# Patient Record
Sex: FEMALE | Race: WHITE | Employment: UNEMPLOYED | URBAN - METROPOLITAN AREA
[De-identification: names, ages, dates, MRNs, and addresses within clinical notes are randomized per-mention and may not be internally consistent; named-entity substitution may affect disease eponyms.]

---

## 2018-05-22 ENCOUNTER — HOSPITAL ENCOUNTER (OUTPATIENT)
Dept: MAMMOGRAPHY | Age: 53
Discharge: HOME | End: 2018-05-22
Attending: FAMILY MEDICINE
Payer: COMMERCIAL

## 2018-05-22 DIAGNOSIS — Z12.39 ENCOUNTER FOR SCREENING FOR MALIGNANT NEOPLASM OF BREAST: ICD-10-CM

## 2018-05-22 PROCEDURE — 77067 SCR MAMMO BI INCL CAD: CPT | Performed by: FAMILY MEDICINE

## 2018-05-22 PROCEDURE — 77063 BREAST TOMOSYNTHESIS BI: CPT | Performed by: FAMILY MEDICINE

## 2018-06-14 ENCOUNTER — HOSPITAL ENCOUNTER (OUTPATIENT)
Dept: MAMMOGRAPHY | Age: 53
Discharge: HOME | End: 2018-06-14
Attending: FAMILY MEDICINE
Payer: COMMERCIAL

## 2018-06-14 DIAGNOSIS — R92.2 INCONCLUSIVE MAMMOGRAM: ICD-10-CM

## 2018-06-14 PROCEDURE — 77065 DX MAMMO INCL CAD UNI: CPT | Performed by: FAMILY MEDICINE

## 2018-09-26 ENCOUNTER — HOSPITAL ENCOUNTER (OUTPATIENT)
Dept: GENERAL RADIOLOGY | Age: 53
Discharge: HOME | End: 2018-09-26
Attending: INTERNAL MEDICINE
Payer: COMMERCIAL

## 2018-09-26 DIAGNOSIS — M35.3 PMR (POLYMYALGIA RHEUMATICA) (HCC): ICD-10-CM

## 2018-09-26 PROCEDURE — 73562 X-RAY EXAM OF KNEE 3: CPT | Performed by: INTERNAL MEDICINE

## 2018-09-26 PROCEDURE — 72100 X-RAY EXAM L-S SPINE 2/3 VWS: CPT | Performed by: INTERNAL MEDICINE

## 2018-09-26 PROCEDURE — 73130 X-RAY EXAM OF HAND: CPT | Performed by: INTERNAL MEDICINE

## 2018-09-26 PROCEDURE — 73523 X-RAY EXAM HIPS BI 5/> VIEWS: CPT | Performed by: INTERNAL MEDICINE

## 2019-06-17 ENCOUNTER — HOSPITAL ENCOUNTER (OUTPATIENT)
Dept: GENERAL RADIOLOGY | Age: 54
Discharge: HOME | End: 2019-06-17
Attending: INTERNAL MEDICINE
Payer: COMMERCIAL

## 2019-06-17 DIAGNOSIS — M25.579 ANKLE PAIN, UNSPECIFIED CHRONICITY, UNSPECIFIED LATERALITY: ICD-10-CM

## 2019-06-17 DIAGNOSIS — M54.9 BACK PAIN: ICD-10-CM

## 2019-06-17 DIAGNOSIS — M25.579 ANKLE PAIN: ICD-10-CM

## 2019-06-17 DIAGNOSIS — Z79.52 LONG TERM (CURRENT) USE OF SYSTEMIC STEROIDS: ICD-10-CM

## 2019-06-17 PROCEDURE — 72100 X-RAY EXAM L-S SPINE 2/3 VWS: CPT | Performed by: INTERNAL MEDICINE

## 2019-06-17 PROCEDURE — 73630 X-RAY EXAM OF FOOT: CPT | Performed by: INTERNAL MEDICINE

## 2019-06-17 PROCEDURE — 73610 X-RAY EXAM OF ANKLE: CPT | Performed by: INTERNAL MEDICINE

## 2019-06-28 ENCOUNTER — HOSPITAL ENCOUNTER (OUTPATIENT)
Dept: MRI IMAGING | Age: 54
Discharge: HOME | End: 2019-06-28
Attending: INTERNAL MEDICINE
Payer: COMMERCIAL

## 2019-06-28 DIAGNOSIS — M19.90 OSTEOARTHRITIS, UNSPECIFIED OSTEOARTHRITIS TYPE, UNSPECIFIED SITE: ICD-10-CM

## 2019-06-28 DIAGNOSIS — Z79.52 LONG TERM CURRENT USE OF SYSTEMIC STEROIDS: ICD-10-CM

## 2019-06-28 DIAGNOSIS — M35.3 PMR (POLYMYALGIA RHEUMATICA) (HCC): ICD-10-CM

## 2019-06-28 DIAGNOSIS — M79.18 MYOFASCIAL PAIN SYNDROME: ICD-10-CM

## 2019-06-28 PROCEDURE — 73723 MRI JOINT LWR EXTR W/O&W/DYE: CPT | Performed by: INTERNAL MEDICINE

## 2019-06-28 PROCEDURE — A9575 INJ GADOTERATE MEGLUMI 0.1ML: HCPCS | Performed by: RADIOLOGY

## 2019-06-28 PROCEDURE — 73720 MRI LWR EXTREMITY W/O&W/DYE: CPT | Performed by: INTERNAL MEDICINE

## 2020-01-23 ENCOUNTER — HOSPITAL ENCOUNTER (OUTPATIENT)
Dept: CT IMAGING | Age: 55
End: 2020-01-23
Attending: INTERNAL MEDICINE
Payer: COMMERCIAL

## 2020-01-23 ENCOUNTER — HOSPITAL ENCOUNTER (OUTPATIENT)
Dept: MAMMOGRAPHY | Age: 55
Discharge: HOME | End: 2020-01-23
Attending: FAMILY MEDICINE
Payer: COMMERCIAL

## 2020-01-23 DIAGNOSIS — Z12.31 ENCOUNTER FOR SCREENING MAMMOGRAM FOR MALIGNANT NEOPLASM OF BREAST: ICD-10-CM

## 2020-01-23 PROCEDURE — 77063 BREAST TOMOSYNTHESIS BI: CPT | Performed by: FAMILY MEDICINE

## 2020-01-23 PROCEDURE — 77067 SCR MAMMO BI INCL CAD: CPT | Performed by: FAMILY MEDICINE

## 2020-02-10 ENCOUNTER — TELEPHONE (OUTPATIENT)
Dept: SURGERY | Facility: CLINIC | Age: 55
End: 2020-02-10

## 2020-02-12 ENCOUNTER — OFFICE VISIT (OUTPATIENT)
Dept: SURGERY | Facility: CLINIC | Age: 55
End: 2020-02-12
Payer: COMMERCIAL

## 2020-02-12 VITALS
WEIGHT: 293 LBS | HEART RATE: 80 BPM | RESPIRATION RATE: 16 BRPM | SYSTOLIC BLOOD PRESSURE: 123 MMHG | DIASTOLIC BLOOD PRESSURE: 70 MMHG | HEIGHT: 66.5 IN | BODY MASS INDEX: 46.53 KG/M2

## 2020-02-12 DIAGNOSIS — E66.01 MORBID OBESITY DUE TO EXCESS CALORIES (HCC): Primary | ICD-10-CM

## 2020-02-12 PROBLEM — M54.9 BACK PAIN: Status: ACTIVE | Noted: 2020-02-12

## 2020-02-12 PROBLEM — M19.90 OSTEOARTHRITIS: Status: ACTIVE | Noted: 2020-02-12

## 2020-02-12 RX ORDER — LEVOTHYROXINE SODIUM 0.05 MG/1
50 TABLET ORAL DAILY
COMMUNITY
Start: 2018-06-27 | End: 2020-07-30

## 2020-02-12 RX ORDER — HYDROXYCHLOROQUINE SULFATE 200 MG/1
400 TABLET, FILM COATED ORAL DAILY
COMMUNITY
Start: 2020-01-07

## 2020-02-12 RX ORDER — TRAMADOL HYDROCHLORIDE 50 MG/1
50 TABLET ORAL NIGHTLY
Refills: 3 | COMMUNITY
Start: 2019-11-28

## 2020-02-12 RX ORDER — RISEDRONATE SODIUM 150 MG/1
150 TABLET, FILM COATED ORAL
COMMUNITY
Start: 2019-09-12

## 2020-02-12 RX ORDER — CELECOXIB 100 MG/1
100 CAPSULE ORAL 2 TIMES DAILY
COMMUNITY
Start: 2018-08-10

## 2020-02-12 RX ORDER — PREDNISONE 1 MG/1
10 TABLET ORAL DAILY
COMMUNITY
Start: 2019-12-07

## 2020-02-12 RX ORDER — OMEPRAZOLE 20 MG/1
20 CAPSULE, DELAYED RELEASE ORAL DAILY
COMMUNITY

## 2020-02-12 RX ORDER — GUAIFENESIN/EPHEDRINE HCL 200-12.5MG
1 TABLET ORAL DAILY
COMMUNITY

## 2020-02-12 NOTE — H&P
[address]  Dept: [phone]    2/12/2020  Bariatric New Patient Evaluation    Chief Complaint:  morbid obesity     History of Present Illness Take 50 mcg by mouth daily. , Disp: , Rfl:   •  Hydroxychloroquine Sulfate 200 MG Oral Tab, Take 200 mg by mouth daily. , Disp: , Rfl:   •  5-Hydroxytryptophan (5-HTP) 100 MG Oral Cap, Take 1 tablet by mouth daily. , Disp: , Rfl:   •  omeprazole 20 MG Oral Ca cardiac, pulmonary, dietician, and psychology evaluations. Baseline laboratory studies will be checked. This will be done at [address] [place] [address] due to pt's insurance requirements. The patient will be referred to [facility] for medically supervised weight loss.   I

## 2020-02-13 ENCOUNTER — OFFICE VISIT (OUTPATIENT)
Dept: INTERNAL MEDICINE CLINIC | Facility: CLINIC | Age: 55
End: 2020-02-13
Payer: COMMERCIAL

## 2020-02-13 VITALS
HEART RATE: 78 BPM | WEIGHT: 293 LBS | SYSTOLIC BLOOD PRESSURE: 124 MMHG | BODY MASS INDEX: 46.53 KG/M2 | HEIGHT: 66.5 IN | RESPIRATION RATE: 16 BRPM | DIASTOLIC BLOOD PRESSURE: 80 MMHG

## 2020-02-13 DIAGNOSIS — M12.30 PALINDROMIC RHEUMATISM: ICD-10-CM

## 2020-02-13 DIAGNOSIS — E66.01 OBESITY, CLASS III, BMI 40-49.9 (MORBID OBESITY) (HCC): ICD-10-CM

## 2020-02-13 DIAGNOSIS — Z51.81 THERAPEUTIC DRUG MONITORING: Primary | ICD-10-CM

## 2020-02-13 DIAGNOSIS — M19.90 OSTEOARTHRITIS, UNSPECIFIED OSTEOARTHRITIS TYPE, UNSPECIFIED SITE: ICD-10-CM

## 2020-02-13 DIAGNOSIS — G47.00 INSOMNIA, UNSPECIFIED TYPE: ICD-10-CM

## 2020-02-13 DIAGNOSIS — E03.9 HYPOTHYROIDISM, UNSPECIFIED TYPE: ICD-10-CM

## 2020-02-13 PROBLEM — Z79.52 LONG TERM CURRENT USE OF SYSTEMIC STEROIDS: Status: ACTIVE | Noted: 2018-09-17

## 2020-02-13 PROBLEM — M79.7 FIBROMYALGIA: Status: ACTIVE | Noted: 2018-01-11

## 2020-02-13 PROBLEM — M70.62 TROCHANTERIC BURSITIS OF LEFT HIP: Status: ACTIVE | Noted: 2018-08-07

## 2020-02-13 PROBLEM — E66.9 OBESITY: Status: ACTIVE | Noted: 2017-03-31

## 2020-02-13 PROBLEM — K21.9 GASTROESOPHAGEAL REFLUX DISEASE WITHOUT ESOPHAGITIS: Status: ACTIVE | Noted: 2017-03-31

## 2020-02-13 PROBLEM — M25.50 MULTIPLE JOINT PAIN: Status: ACTIVE | Noted: 2018-08-07

## 2020-02-13 PROBLEM — J30.9 ALLERGIC RHINITIS: Status: ACTIVE | Noted: 2017-03-31

## 2020-02-13 PROCEDURE — 99204 OFFICE O/P NEW MOD 45 MIN: CPT | Performed by: INTERNAL MEDICINE

## 2020-02-13 RX ORDER — FLUTICASONE PROPIONATE 50 MCG
SPRAY, SUSPENSION (ML) NASAL DAILY
COMMUNITY

## 2020-02-13 NOTE — PATIENT INSTRUCTIONS
Plan:  Continue with medications:   Go to the lab for blood work   Follow up with me in 1 month  Schedule follow up appointments: Sandoval Lopez (dietitian) or Alfredo Calderon (presurgery dietitian)   Check for insurance coverage for dietitian and labwork pr -cured meats (monitor for sodium issues)   -hummus with vegetables  -bean dip with vegetables    FRUIT  Low carb fruit options   Raspberries: Half a cup (60 grams) contains 3 grams of carbs. Blackberries: Half a cup (70 grams) contains 4 grams of carbs.

## 2020-03-06 ENCOUNTER — TELEPHONE (OUTPATIENT)
Dept: INTERNAL MEDICINE CLINIC | Facility: CLINIC | Age: 55
End: 2020-03-06

## 2020-03-06 DIAGNOSIS — E66.01 OBESITY, CLASS III, BMI 40-49.9 (MORBID OBESITY) (HCC): Primary | ICD-10-CM

## 2020-03-07 NOTE — TELEPHONE ENCOUNTER
Martha Kirkpatrick RN Hi Amy,     Can you please enter a Dietitian order for Dr. Edilberto Yates patient who is seeing Lela Robles for initial appt on Tuesday next week?      I will use E66.01 to verify benefits       Order placed

## 2020-03-09 ENCOUNTER — APPOINTMENT (OUTPATIENT)
Dept: NUTRITION/OBESITY MEDICINE | Facility: HOSPITAL | Age: 55
End: 2020-03-09
Attending: SINGLE SPECIALTY
Payer: COMMERCIAL

## 2020-03-10 ENCOUNTER — OFFICE VISIT (OUTPATIENT)
Dept: INTERNAL MEDICINE CLINIC | Facility: CLINIC | Age: 55
End: 2020-03-10
Payer: COMMERCIAL

## 2020-03-10 VITALS — BODY MASS INDEX: 46.53 KG/M2 | WEIGHT: 293 LBS | HEIGHT: 66.5 IN

## 2020-03-10 DIAGNOSIS — E66.01 CLASS 3 SEVERE OBESITY DUE TO EXCESS CALORIES WITH BODY MASS INDEX (BMI) OF 45.0 TO 49.9 IN ADULT, UNSPECIFIED WHETHER SERIOUS COMORBIDITY PRESENT (HCC): Primary | ICD-10-CM

## 2020-03-10 PROCEDURE — 97802 MEDICAL NUTRITION INDIV IN: CPT | Performed by: DIETITIAN, REGISTERED

## 2020-03-10 NOTE — PROGRESS NOTES
[facility]  [address] [place] [id number]  Dept: [phone]  Loc: [phone]    03/10/20    Bariatric Initial Nutrition Assessment    Jerri Lopez is a 62year old female. purging: no    Patient complains of: abdominal pain, takes Prilosec daily    Patient's motivation for bariatric surgery: Has struggled for her entire life, wants to improve QOL, move easier, wants to be around for future grandchildren    Allergies:  No Kno mouth daily. , Disp: , Rfl:   •  omeprazole 20 MG Oral Capsule Delayed Release, Take 20 mg by mouth daily. , Disp: , Rfl:   •  predniSONE 5 MG Oral Tab, Take 10 mg by mouth daily.   , Disp: , Rfl:   •  Risedronate Sodium 150 MG Oral Tab, Take 150 mg by mouth different diets and medications in the past w/ little sustained success. Pt w/ moderate diet quality, eats at regular intervals throughout the day, but some meals can be carb heavy and pt struggles with snacking before bed.  Reviewed the importance of impro requirements? Introduced, will reinforce  Patient understand fluid requirements (amount and method of intake)? Yes  Patient understands post-operative diet? Will assess with quiz  Patient ready for Liquid Protein Education?  No    Jerri Plata MS RD LDN

## 2020-03-11 ENCOUNTER — OFFICE VISIT (OUTPATIENT)
Dept: CARDIOLOGY | Age: 55
End: 2020-03-11

## 2020-03-11 VITALS
DIASTOLIC BLOOD PRESSURE: 70 MMHG | HEIGHT: 66 IN | HEART RATE: 78 BPM | BODY MASS INDEX: 47.09 KG/M2 | WEIGHT: 293 LBS | SYSTOLIC BLOOD PRESSURE: 130 MMHG

## 2020-03-11 DIAGNOSIS — Z01.818 PRE-OP EVALUATION: Primary | ICD-10-CM

## 2020-03-11 DIAGNOSIS — E03.9 HYPOTHYROIDISM, ACQUIRED: ICD-10-CM

## 2020-03-11 DIAGNOSIS — E66.01 OBESITY, CLASS III, BMI 40-49.9 (MORBID OBESITY) (CMD): Chronic | ICD-10-CM

## 2020-03-11 PROCEDURE — 99205 OFFICE O/P NEW HI 60 MIN: CPT | Performed by: INTERNAL MEDICINE

## 2020-03-11 PROCEDURE — 93000 ELECTROCARDIOGRAM COMPLETE: CPT | Performed by: INTERNAL MEDICINE

## 2020-03-11 RX ORDER — HYDROXYCHLOROQUINE SULFATE 200 MG/1
400 TABLET, FILM COATED ORAL DAILY
COMMUNITY
Start: 2020-01-07

## 2020-03-11 RX ORDER — LEVOTHYROXINE SODIUM 0.05 MG/1
50 TABLET ORAL DAILY
COMMUNITY
Start: 2017-10-27

## 2020-03-11 RX ORDER — OMEPRAZOLE 20 MG/1
20 CAPSULE, DELAYED RELEASE ORAL DAILY
COMMUNITY

## 2020-03-11 RX ORDER — CELECOXIB 100 MG/1
100 CAPSULE ORAL DAILY
Refills: 1 | COMMUNITY
Start: 2019-12-31

## 2020-03-11 RX ORDER — PREDNISONE 2.5 MG/1
7.5 TABLET ORAL DAILY
COMMUNITY
Start: 2019-07-17

## 2020-03-11 RX ORDER — TRAMADOL HYDROCHLORIDE 50 MG/1
100 TABLET ORAL DAILY
COMMUNITY
Start: 2018-09-12

## 2020-03-11 SDOH — HEALTH STABILITY: MENTAL HEALTH: HOW OFTEN DO YOU HAVE A DRINK CONTAINING ALCOHOL?: NEVER

## 2020-03-11 ASSESSMENT — ENCOUNTER SYMPTOMS
BRUISES/BLEEDS EASILY: 0
COUGH: 0
SUSPICIOUS LESIONS: 0
WEIGHT LOSS: 0
HEMATOCHEZIA: 0
HEMOPTYSIS: 0
CHILLS: 0
FEVER: 0
WEIGHT GAIN: 0
ALLERGIC/IMMUNOLOGIC COMMENTS: NO NEW FOOD ALLERGIES

## 2020-03-11 ASSESSMENT — PATIENT HEALTH QUESTIONNAIRE - PHQ9
SUM OF ALL RESPONSES TO PHQ9 QUESTIONS 1 AND 2: 0
1. LITTLE INTEREST OR PLEASURE IN DOING THINGS: NOT AT ALL
2. FEELING DOWN, DEPRESSED OR HOPELESS: NOT AT ALL
SUM OF ALL RESPONSES TO PHQ9 QUESTIONS 1 AND 2: 0

## 2020-03-16 ENCOUNTER — OFFICE VISIT (OUTPATIENT)
Dept: NUTRITION/OBESITY MEDICINE | Facility: HOSPITAL | Age: 55
End: 2020-03-16
Attending: SINGLE SPECIALTY
Payer: COMMERCIAL

## 2020-03-16 DIAGNOSIS — E66.01 MORBID OBESITY DUE TO EXCESS CALORIES (HCC): Primary | ICD-10-CM

## 2020-03-16 PROCEDURE — 96130 PSYCL TST EVAL PHYS/QHP 1ST: CPT | Performed by: OTHER

## 2020-03-16 NOTE — PROGRESS NOTES
Psychological Evaluation    Patient Name: nAna Marie Wei  Visit Date:  3/16/2020  :   1965    Reason for Referral:    Raquel Nicholson is a 62year old   female who was referred for a psychological evaluation prior to being scheduled from Osteoarthritis, GERD, and Hypothyroidism. Her current medications include: Levothyroxine, Tramadol, Omeprazole, and Prednisone. Concerning a familial history of medical issues, King Segundo noted that her mother suffers from hypoglycemia.       King Segundo denied participate in the interview and she was cooperative with the interviewer and open in her presentation of personal information. She was oriented as to time, person, and place. There appeared to be no abnormalities in her sensorium or mental capacity.   Her of meat, chicken, turkey, or fish per day, eats regular chips, adds salt to foods, and does less than 30 minutes of physical activity 3 days a week or more.   On the other hand, Rachel Mike endorsed a strong willingness to further refine her eating habits in an e Time Spent with Client:     50 Minutes: [id number] of appropriate tests, integration of patient data, interpretation of standardized test results and clinical data, clinical decision-making, treatment planning, reporting feedback    Dearl Elias

## 2020-03-18 ENCOUNTER — TELEPHONE (OUTPATIENT)
Dept: CARDIOLOGY | Age: 55
End: 2020-03-18

## 2020-03-19 ENCOUNTER — TELEPHONE (OUTPATIENT)
Dept: INTERNAL MEDICINE CLINIC | Facility: CLINIC | Age: 55
End: 2020-03-19

## 2020-03-19 DIAGNOSIS — Z51.81 THERAPEUTIC DRUG MONITORING: Primary | ICD-10-CM

## 2020-03-19 DIAGNOSIS — E66.01 CLASS 3 SEVERE OBESITY DUE TO EXCESS CALORIES WITH BODY MASS INDEX (BMI) OF 45.0 TO 49.9 IN ADULT, UNSPECIFIED WHETHER SERIOUS COMORBIDITY PRESENT (HCC): ICD-10-CM

## 2020-03-23 NOTE — TELEPHONE ENCOUNTER
[facility] Weight Management check in/follow up encounter:    Virtual/Telephone Check-In    Mil Rabago verbally consents to a Virtual/Telephone Check-In service on 03/23/20.   Patient understands and accepts financial responsibility for an

## 2020-04-20 ENCOUNTER — VIRTUAL PHONE E/M (OUTPATIENT)
Dept: INTERNAL MEDICINE CLINIC | Facility: CLINIC | Age: 55
End: 2020-04-20
Payer: COMMERCIAL

## 2020-04-20 DIAGNOSIS — R73.03 PREDIABETES: Primary | ICD-10-CM

## 2020-04-20 DIAGNOSIS — E66.01 CLASS 3 SEVERE OBESITY DUE TO EXCESS CALORIES WITH BODY MASS INDEX (BMI) OF 45.0 TO 49.9 IN ADULT, UNSPECIFIED WHETHER SERIOUS COMORBIDITY PRESENT (HCC): ICD-10-CM

## 2020-04-20 PROCEDURE — 99441 PHONE E/M BY PHYS 5-10 MIN: CPT | Performed by: INTERNAL MEDICINE

## 2020-04-20 NOTE — PROGRESS NOTES
[facility] Weight Management check in/follow up encounter  Virtual/Telephone Check-In    Baron Huang verbally consents to a Virtual/Telephone Check-In service on 04/20/20    Still struggling with high stress/ recently had to travel.  Has be

## 2020-05-06 ENCOUNTER — TELEMEDICINE (OUTPATIENT)
Dept: INTERNAL MEDICINE CLINIC | Facility: CLINIC | Age: 55
End: 2020-05-06

## 2020-05-06 VITALS — BODY MASS INDEX: 47.09 KG/M2 | HEIGHT: 66 IN | WEIGHT: 293 LBS

## 2020-05-06 DIAGNOSIS — E66.01 CLASS 3 SEVERE OBESITY DUE TO EXCESS CALORIES WITH BODY MASS INDEX (BMI) OF 45.0 TO 49.9 IN ADULT, UNSPECIFIED WHETHER SERIOUS COMORBIDITY PRESENT (HCC): Primary | ICD-10-CM

## 2020-05-06 PROCEDURE — 97803 MED NUTRITION INDIV SUBSEQ: CPT | Performed by: DIETITIAN, REGISTERED

## 2020-05-06 NOTE — PROGRESS NOTES
[address] [address]  Dept: [phone]: [phone]    05/06/20      Bariatric Follow-up Nutrition Session    Consultation conducted via telehealth.     Pt ve Rfl:   •  Levothyroxine Sodium 50 MCG Oral Tab, Take 50 mcg by mouth daily. , Disp: , Rfl:   •  omeprazole 20 MG Oral Capsule Delayed Release, Take 20 mg by mouth daily. , Disp: , Rfl:   •  predniSONE 5 MG Oral Tab, Take 10 mg by mouth daily.   , Disp: , Rfl: and stopped tracking food intake. Reviewed the importance of monitoring protein/kcal/carb intake prior to and after surgery to ensure needs are met. Water intake remains high at 100+ oz per day and pt is down from 2 cups coffee per day to one cup.  Activity

## 2020-05-18 ENCOUNTER — VIRTUAL PHONE E/M (OUTPATIENT)
Dept: INTERNAL MEDICINE CLINIC | Facility: CLINIC | Age: 55
End: 2020-05-18

## 2020-05-18 DIAGNOSIS — E66.01 CLASS 3 SEVERE OBESITY DUE TO EXCESS CALORIES WITH BODY MASS INDEX (BMI) OF 45.0 TO 49.9 IN ADULT, UNSPECIFIED WHETHER SERIOUS COMORBIDITY PRESENT (HCC): ICD-10-CM

## 2020-05-18 DIAGNOSIS — Z51.81 THERAPEUTIC DRUG MONITORING: Primary | ICD-10-CM

## 2020-05-18 DIAGNOSIS — R73.03 PREDIABETES: ICD-10-CM

## 2020-05-18 PROCEDURE — 99442 PHONE E/M BY PHYS 11-20 MIN: CPT | Performed by: INTERNAL MEDICINE

## 2020-05-18 NOTE — PROGRESS NOTES
[facility] Weight Management check in/follow up encounter  Virtual/Telephone Check-In    Marilynrosalinda Kellyherlinda verbally consents to a Virtual/Telephone Check-In service on 05/18/20  Has touched based with Bernadine.    Has not lost or gained any weight

## 2020-05-27 ENCOUNTER — TELEPHONE (OUTPATIENT)
Dept: SURGERY | Facility: CLINIC | Age: 55
End: 2020-05-27

## 2020-06-10 ENCOUNTER — OFFICE VISIT (OUTPATIENT)
Dept: INTERNAL MEDICINE CLINIC | Facility: CLINIC | Age: 55
End: 2020-06-10
Payer: COMMERCIAL

## 2020-06-10 ENCOUNTER — TELEPHONE (OUTPATIENT)
Dept: SURGERY | Facility: CLINIC | Age: 55
End: 2020-06-10

## 2020-06-10 VITALS — HEIGHT: 66 IN | BODY MASS INDEX: 49 KG/M2

## 2020-06-10 DIAGNOSIS — E66.01 CLASS 3 SEVERE OBESITY DUE TO EXCESS CALORIES WITH SERIOUS COMORBIDITY AND BODY MASS INDEX (BMI) OF 45.0 TO 49.9 IN ADULT (HCC): Primary | ICD-10-CM

## 2020-06-10 NOTE — PROGRESS NOTES
[address] [address]  Dept: [phone]  Loc: [phone]    06/10/20      Bariatric Follow-up Nutrition Session    Maurisio Reich is a 62year old female. Rfl:   •  Risedronate Sodium 150 MG Oral Tab, Take 150 mg by mouth every 30 (thirty) days. , Disp: , Rfl:   •  traMADol HCl 50 MG Oral Tab, Take 50 mg by mouth nightly., Disp: , Rfl: 3  •  Hydroxychloroquine Sulfate 200 MG Oral Tab, Take 400 mg by mouth peyton

## 2020-06-12 ENCOUNTER — HOSPITAL ENCOUNTER (OUTPATIENT)
Dept: CV DIAGNOSTICS | Facility: HOSPITAL | Age: 55
Discharge: HOME | End: 2020-06-12
Attending: INTERNAL MEDICINE
Payer: COMMERCIAL

## 2020-06-12 DIAGNOSIS — Z01.818 PRE-OP EVALUATION: ICD-10-CM

## 2020-06-12 DIAGNOSIS — E66.01 OBESITY, CLASS III, BMI 40-49.9 (MORBID OBESITY) (HCC): ICD-10-CM

## 2020-06-12 PROCEDURE — 93306 TTE W/DOPPLER COMPLETE: CPT | Performed by: INTERNAL MEDICINE

## 2020-06-16 ENCOUNTER — TELEPHONE (OUTPATIENT)
Dept: INTERNAL MEDICINE CLINIC | Facility: CLINIC | Age: 55
End: 2020-06-16

## 2020-06-16 ENCOUNTER — OFFICE VISIT (OUTPATIENT)
Dept: INTERNAL MEDICINE CLINIC | Facility: CLINIC | Age: 55
End: 2020-06-16

## 2020-06-16 ENCOUNTER — TELEPHONE (OUTPATIENT)
Dept: CARDIOLOGY | Age: 55
End: 2020-06-16

## 2020-06-16 VITALS — BODY MASS INDEX: 47.09 KG/M2 | WEIGHT: 293 LBS | HEIGHT: 66 IN

## 2020-06-16 DIAGNOSIS — E66.01 CLASS 3 SEVERE OBESITY DUE TO EXCESS CALORIES WITH SERIOUS COMORBIDITY AND BODY MASS INDEX (BMI) OF 50.0 TO 59.9 IN ADULT (HCC): Primary | ICD-10-CM

## 2020-06-16 PROCEDURE — 97803 MED NUTRITION INDIV SUBSEQ: CPT | Performed by: DIETITIAN, REGISTERED

## 2020-06-16 NOTE — TELEPHONE ENCOUNTER
Returned patient's voicemail left on Monday 6/15/20.  Left detailed message with answers to patient's questions below:  * Status of surgeon's with [employer] - advised patient I have heard of no change but encouraged her to follow up with surgeon's office regard

## 2020-06-16 NOTE — PROGRESS NOTES
[address]  [address] [address]  Dept: [phone]  Loc: [phone]    06/16/20      Bariatric Follow-up Nutrition Session    Mil Rabago is a 62year old female. Rfl:   •  Risedronate Sodium 150 MG Oral Tab, Take 150 mg by mouth every 30 (thirty) days. , Disp: , Rfl:   •  traMADol HCl 50 MG Oral Tab, Take 50 mg by mouth nightly., Disp: , Rfl: 3  •  Hydroxychloroquine Sulfate 200 MG Oral Tab, Take 400 mg by mouth peyton is minimal d/t a recent ankle injury, but pt has started walking and doing yardwork several days per week. Pt asked appropriate questions and agreed to goals below, will f/u in 6 weeks for accountability and goal monitoring.     Nutrition Diagnosis     Nutr

## 2020-06-23 ENCOUNTER — TELEPHONE (OUTPATIENT)
Dept: SURGERY | Facility: CLINIC | Age: 55
End: 2020-06-23

## 2020-06-23 NOTE — TELEPHONE ENCOUNTER
Returned patient's voicemail regarding additional questions about [employer] benefits and Bariatric Program. Advised patient that Prateek Cid did update their Bariatric surgical policy as of 4/7/87 from 3 consecutive month weight management criteria to 12 s

## 2020-06-30 ENCOUNTER — OFFICE VISIT (OUTPATIENT)
Dept: INTERNAL MEDICINE CLINIC | Facility: CLINIC | Age: 55
End: 2020-06-30
Payer: COMMERCIAL

## 2020-06-30 VITALS
HEART RATE: 88 BPM | RESPIRATION RATE: 16 BRPM | HEIGHT: 66 IN | DIASTOLIC BLOOD PRESSURE: 78 MMHG | TEMPERATURE: 98 F | SYSTOLIC BLOOD PRESSURE: 120 MMHG | BODY MASS INDEX: 47.09 KG/M2 | WEIGHT: 293 LBS

## 2020-06-30 DIAGNOSIS — Z79.52 LONG TERM CURRENT USE OF SYSTEMIC STEROIDS: ICD-10-CM

## 2020-06-30 DIAGNOSIS — M19.90 OSTEOARTHRITIS, UNSPECIFIED OSTEOARTHRITIS TYPE, UNSPECIFIED SITE: ICD-10-CM

## 2020-06-30 DIAGNOSIS — E66.01 CLASS 3 SEVERE OBESITY DUE TO EXCESS CALORIES WITH SERIOUS COMORBIDITY AND BODY MASS INDEX (BMI) OF 50.0 TO 59.9 IN ADULT (HCC): Primary | ICD-10-CM

## 2020-06-30 DIAGNOSIS — R73.03 PREDIABETES: ICD-10-CM

## 2020-06-30 DIAGNOSIS — E03.9 HYPOTHYROIDISM, UNSPECIFIED TYPE: ICD-10-CM

## 2020-06-30 PROCEDURE — 99213 OFFICE O/P EST LOW 20 MIN: CPT | Performed by: PHYSICIAN ASSISTANT

## 2020-06-30 RX ORDER — OMEPRAZOLE 10 MG/1
CAPSULE, DELAYED RELEASE ORAL
COMMUNITY
Start: 2014-08-11 | End: 2020-06-30

## 2020-06-30 RX ORDER — IBUPROFEN 800 MG/1
TABLET ORAL
COMMUNITY
Start: 2018-12-21

## 2020-06-30 NOTE — TELEPHONE ENCOUNTER
2/10/20  @ 9:35am  Spoke to [place] at [place], #[phone], JAM#[id number].  They verified that patient has following benefits for below services:   DX E66.01    E66.9     [facility] ([id number])/ANIOICHERISE Benefits  • Bariatric Benefits – Y

## 2020-06-30 NOTE — PROGRESS NOTES
HISTORY OF PRESENT ILLNESS  Patient presents with:  Weight Check: up 7 lb      Suhas Campbell is a 62year old female here for follow up in medical weight loss program.     Denies chest pain, shortness of breath, dizziness, blurred vision, headach No results found for: WBC, RBC, HGB, HCT, MCV, MCH, MCHC, RDW, PLT, MPV  No results found for: GLU, BUN, BUNCREA, CREATSERUM, ANIONGAP, GFR, GFRNAA, GFRAA, CA, OSMOCALC, ALKPHO, AST, ALT, ALKPHOS, BILT, TP, ALB, GLOBULT, GLOBULIN, AGRATIO, ALBGLOBRAT, NA, Prediabetes        PLAN  · Once surgery date is scheduled will go through medications, taper off prednisone, dc celebrex  · Nutrition: low carb diet/ recommended to eat breakfast daily/ regular protein intake  · Medication use and side effects reviewed w [place] balanced breaks (cheese and nuts)- without chocolate  5.  Reduce carbohydrates which includes sweets as well as rice, pasta, potatoes, bread, corn and instead choose whole grain options or more protein or vegetables (4-6 servings of v Blueberries: Half a cup (50 grams) contains 6 grams of carbs. Plum: One medium-sized (80 grams) contains 6 grams of carbs. VEGETABLES  Low carb vegetables              No follow-ups on file. Patient verbalizes understanding.     May Espinosa

## 2020-07-01 NOTE — PATIENT INSTRUCTIONS
We are here to support you with weight loss, but please remember that you still need your primary care provider for your routine health maintenance.       PLAN:  Follow up with me in 1 month  Schedule follow up appointments: Stella Frances (dietitian) or Fernando Clark ** Daily INPUT> Look at nutrition section-- \"nutrients\" and it will break down your macros for the day (ie. Protein, carbs, fibers, sugars and fats). Try to stay within these numbers daily     2.  \"7 minute workout\" to help with exercise/a

## 2020-07-15 ENCOUNTER — TELEPHONE (OUTPATIENT)
Dept: SURGERY | Facility: CLINIC | Age: 55
End: 2020-07-15

## 2020-07-15 NOTE — TELEPHONE ENCOUNTER
Called and left message for patient to call back to discuss insurance and Bariatric Program. Per call made to [facility] on 6/29/20 plan was updated and patient now [address].  Carlyon Peabody from surgeons [place]

## 2020-07-30 ENCOUNTER — OFFICE VISIT (OUTPATIENT)
Dept: INTERNAL MEDICINE CLINIC | Facility: CLINIC | Age: 55
End: 2020-07-30
Payer: COMMERCIAL

## 2020-07-30 VITALS
HEIGHT: 66 IN | WEIGHT: 293 LBS | HEART RATE: 78 BPM | SYSTOLIC BLOOD PRESSURE: 122 MMHG | TEMPERATURE: 98 F | DIASTOLIC BLOOD PRESSURE: 80 MMHG | BODY MASS INDEX: 47.09 KG/M2 | RESPIRATION RATE: 16 BRPM

## 2020-07-30 DIAGNOSIS — Z79.52 LONG TERM CURRENT USE OF SYSTEMIC STEROIDS: ICD-10-CM

## 2020-07-30 DIAGNOSIS — E03.9 HYPOTHYROIDISM, UNSPECIFIED TYPE: ICD-10-CM

## 2020-07-30 DIAGNOSIS — E66.01 CLASS 3 SEVERE OBESITY DUE TO EXCESS CALORIES WITH SERIOUS COMORBIDITY AND BODY MASS INDEX (BMI) OF 50.0 TO 59.9 IN ADULT (HCC): Primary | ICD-10-CM

## 2020-07-30 DIAGNOSIS — K21.9 GASTROESOPHAGEAL REFLUX DISEASE WITHOUT ESOPHAGITIS: ICD-10-CM

## 2020-07-30 PROCEDURE — 3008F BODY MASS INDEX DOCD: CPT | Performed by: PHYSICIAN ASSISTANT

## 2020-07-30 PROCEDURE — 99213 OFFICE O/P EST LOW 20 MIN: CPT | Performed by: PHYSICIAN ASSISTANT

## 2020-07-30 PROCEDURE — 3079F DIAST BP 80-89 MM HG: CPT | Performed by: PHYSICIAN ASSISTANT

## 2020-07-30 PROCEDURE — 3074F SYST BP LT 130 MM HG: CPT | Performed by: PHYSICIAN ASSISTANT

## 2020-07-30 RX ORDER — HYDROXYCHLOROQUINE SULFATE 200 MG/1
TABLET, FILM COATED ORAL
COMMUNITY
End: 2020-07-30

## 2020-07-30 RX ORDER — LEVOTHYROXINE SODIUM 0.05 MG/1
TABLET ORAL
COMMUNITY

## 2020-07-30 NOTE — PROGRESS NOTES
HISTORY OF PRESENT ILLNESS  Patient presents with:  Weight Check: down 4 lb      Mil Rabago is a 62year old female here for follow up in medical weight loss program.   Insurance changed where she can have surgery, so she will have surgery at [facility]  No results found for: CHOLEST, TRIG, HDL, LDL, VLDL, TCHDLRATIO, NONHDLC, CHOLHDLRATIO, NONHDLC, CALCNONHDL  No results found for: T4F, TSH, TSHT4  No results found for: B12, VITB12  No results found for: VITD, QVITD, VITD25, QBNX82MZ    Levothyroxine cayetano celebrex 2 weeks prior to surgery  · Nutrition: low carb diet/ recommended to eat breakfast daily/ regular protein intake  · Medication use and side effects reviewed with patient.   Medication contraindications: n/a  · Follow up with dietitian and psycho

## 2020-08-05 ENCOUNTER — TELEPHONE (OUTPATIENT)
Dept: INTERNAL MEDICINE CLINIC | Facility: CLINIC | Age: 55
End: 2020-08-05

## 2020-08-05 NOTE — TELEPHONE ENCOUNTER
Faxed medical records release form to # [phone] attn: Dax Mcclure [facility]     Fax confirmation recvd

## 2020-08-17 ENCOUNTER — OFFICE VISIT (OUTPATIENT)
Dept: SURGERY | Facility: CLINIC | Age: 55
End: 2020-08-17
Payer: COMMERCIAL

## 2020-08-17 ENCOUNTER — TELEPHONE (OUTPATIENT)
Dept: INTERNAL MEDICINE CLINIC | Facility: CLINIC | Age: 55
End: 2020-08-17

## 2020-08-17 VITALS — BODY MASS INDEX: 47.09 KG/M2 | HEIGHT: 66 IN | WEIGHT: 293 LBS

## 2020-08-17 DIAGNOSIS — E66.01 CLASS 3 SEVERE OBESITY DUE TO EXCESS CALORIES WITH SERIOUS COMORBIDITY AND BODY MASS INDEX (BMI) OF 45.0 TO 49.9 IN ADULT (HCC): Primary | ICD-10-CM

## 2020-08-17 PROCEDURE — 3008F BODY MASS INDEX DOCD: CPT | Performed by: DIETITIAN, REGISTERED

## 2020-08-17 PROCEDURE — 97803 MED NUTRITION INDIV SUBSEQ: CPT | Performed by: DIETITIAN, REGISTERED

## 2020-08-17 NOTE — PROGRESS NOTES
[address]  [address] [address]  Dept: [phone]  Loc: [phone]    08/17/20      Bariatric Follow-up Nutrition Session    Joan Dong is a 62year old female. Disp: , Rfl:   •  omeprazole 20 MG Oral Capsule Delayed Release, Take 20 mg by mouth daily. , Disp: , Rfl:   •  predniSONE 5 MG Oral Tab, Take 10 mg by mouth daily.   , Disp: , Rfl:   •  Risedronate Sodium 150 MG Oral Tab, Take 150 mg by mouth every 30 (thir 100+ oz per day and pt is down from 2 cups coffee per day to one cup. Activity is minimal d/t a recent ankle injury, but continues to walk and do yardwork several days per week.  Per insurance policy update, pt must have bariatric surgery at an [place] [facility]

## 2020-10-05 ENCOUNTER — MED REC SCAN ONLY (OUTPATIENT)
Dept: INTERNAL MEDICINE CLINIC | Facility: CLINIC | Age: 55
End: 2020-10-05

## 2021-04-19 ENCOUNTER — IMMUNIZATION (OUTPATIENT)
Dept: LAB | Facility: HOSPITAL | Age: 56
End: 2021-04-19
Attending: EMERGENCY MEDICINE
Payer: COMMERCIAL

## 2021-04-19 DIAGNOSIS — Z23 NEED FOR VACCINATION: Primary | ICD-10-CM

## 2021-04-19 PROCEDURE — 0011A SARSCOV2 VAC 100MCG/0.5ML IM: CPT

## 2021-05-17 ENCOUNTER — IMMUNIZATION (OUTPATIENT)
Dept: LAB | Facility: HOSPITAL | Age: 56
End: 2021-05-17
Attending: EMERGENCY MEDICINE
Payer: COMMERCIAL

## 2021-05-17 DIAGNOSIS — Z23 NEED FOR VACCINATION: Primary | ICD-10-CM

## 2021-05-17 PROCEDURE — 0012A SARSCOV2 VAC 100MCG/0.5ML IM: CPT

## 2024-03-29 NOTE — PROGRESS NOTES
HISTORY OF PRESENT ILLNESS  Patient presents with:  Weight Problem: ref Dr Olaf Duarte, tried qysmia in the past      Dameon Mraie is a 62year old female new to our office today for initiation of medical weight loss program.  Patient presents today w Renal disease: negative   Diabetes: negative  Thyroid disease: negative   Constipation: negative  DVT: negative    Family or personal history of Pancreatic issues / Medullary Thyroid Cancer: negative    History of bariatric surgery: negative     [address] review No results found for: B12, VITB12  No results found for: VITD, QVITD, VITD25, QRBU13SN    Fluticasone Propionate 50 MCG/ACT Nasal Suspension, by Each Nare route daily. , Disp: , Rfl:   Cyanocobalamin (VITAMIN B 12 OR), Take by mouth., Disp: , Rfl:   5-Hydro · Reviewed baseline body comp, add aqua aerobics and monitor of strength training. · Follow up with dietitian and psychologist as recommended. · Discussed the role of sleep and stress in weight management. · Labs orders as above.   · Counseled on compre 2. \"7 minute workout\" to help with exercise/activity which takes 7 minutes of your day and that you can do at home! 3. \"Calm\" which helps with mindfulness, meditation, clarity, sleep, and harinder to your daily life.    4. [employer] blog for healthy reci Her/She

## (undated) NOTE — Clinical Note
4-6 weeks follow up with Liam Clifton -lety reconcilation Needs 4 weeks off prednisone prior to surgery